# Patient Record
Sex: MALE | Race: WHITE | NOT HISPANIC OR LATINO | Employment: FULL TIME | URBAN - METROPOLITAN AREA
[De-identification: names, ages, dates, MRNs, and addresses within clinical notes are randomized per-mention and may not be internally consistent; named-entity substitution may affect disease eponyms.]

---

## 2021-09-16 ENCOUNTER — TELEPHONE (OUTPATIENT)
Dept: FAMILY MEDICINE CLINIC | Facility: CLINIC | Age: 56
End: 2021-09-16

## 2021-09-16 NOTE — TELEPHONE ENCOUNTER
Called patient to let him know to arrive at 1pm on Monday 9/20 for appointment. Left message asking patient what er he was seen in so we could try to obtain records for visit.

## 2021-09-20 ENCOUNTER — OFFICE VISIT (OUTPATIENT)
Dept: FAMILY MEDICINE CLINIC | Facility: CLINIC | Age: 56
End: 2021-09-20

## 2021-09-20 VITALS
WEIGHT: 184 LBS | TEMPERATURE: 99.1 F | DIASTOLIC BLOOD PRESSURE: 110 MMHG | HEIGHT: 70 IN | HEART RATE: 101 BPM | BODY MASS INDEX: 26.34 KG/M2 | SYSTOLIC BLOOD PRESSURE: 182 MMHG | OXYGEN SATURATION: 98 %

## 2021-09-20 DIAGNOSIS — Z91.018 NUT ALLERGY: ICD-10-CM

## 2021-09-20 DIAGNOSIS — Z80.42 FAMILY HISTORY OF PROSTATE CANCER IN FATHER: ICD-10-CM

## 2021-09-20 DIAGNOSIS — Z13.220 SCREENING FOR CHOLESTEROL LEVEL: ICD-10-CM

## 2021-09-20 DIAGNOSIS — I10 ESSENTIAL HYPERTENSION: Primary | ICD-10-CM

## 2021-09-20 DIAGNOSIS — Z12.5 ENCOUNTER FOR PROSTATE CANCER SCREENING: ICD-10-CM

## 2021-09-20 PROBLEM — F17.210 CIGARETTE SMOKER: Status: ACTIVE | Noted: 2021-09-20

## 2021-09-20 PROCEDURE — 99204 OFFICE O/P NEW MOD 45 MIN: CPT | Performed by: FAMILY MEDICINE

## 2021-09-20 RX ORDER — PREDNISONE 10 MG/1
TABLET ORAL
COMMUNITY
Start: 2021-08-27 | End: 2021-10-26

## 2021-09-20 RX ORDER — EPINEPHRINE 0.3 MG/.3ML
0.3 INJECTION SUBCUTANEOUS ONCE
Qty: 2 EACH | Refills: 1 | Status: SHIPPED | OUTPATIENT
Start: 2021-09-20 | End: 2021-09-20

## 2021-09-20 RX ORDER — OMEPRAZOLE 20 MG/1
20 CAPSULE, DELAYED RELEASE ORAL AS NEEDED
COMMUNITY

## 2021-09-20 RX ORDER — AMLODIPINE BESYLATE 5 MG/1
5 TABLET ORAL DAILY
Qty: 30 TABLET | Refills: 5 | Status: SHIPPED | OUTPATIENT
Start: 2021-09-20 | End: 2022-03-22 | Stop reason: SDUPTHER

## 2021-09-20 RX ORDER — DIPHENHYDRAMINE HCL 50 MG
50 CAPSULE ORAL EVERY 6 HOURS PRN
COMMUNITY
End: 2022-10-26

## 2021-09-20 RX ORDER — OMEGA-3/DHA/EPA/FISH OIL 500-1000MG
1000 CAPSULE ORAL DAILY
COMMUNITY

## 2021-09-20 RX ORDER — LOSARTAN POTASSIUM 50 MG/1
50 TABLET ORAL DAILY
Qty: 30 TABLET | Refills: 5 | Status: SHIPPED | OUTPATIENT
Start: 2021-09-20 | End: 2021-10-26

## 2021-09-20 NOTE — PROGRESS NOTES
Subjective   Juanjose Barton is a 56 y.o. male.   Chief Complaint   Patient presents with   • Establish Care       History of Present Illness   Presents to the office today as a new patient.  No previous records.  Tells us he originally planned just to get established with a doctor since he had not had one in a long time.  He has had no doctor in 30 years.  Apparently went to [facility] recently with swelling in his face which was felt to be due to pecans in pecan cookies.  He was treated with Benadryl and steroids and that problem has gotten better.  He has avoided nuts since that time.  Apparently his blood pressure was high in the ER as well and they told him he really should get in with somebody to get that addressed.  Apparently his blood pressure was high on another occasion several years ago.  He does not report daily symptoms, but does experience from time to time episodes of tingling in the scalp or pounding in his ears or a ringing in his ears.  No other officially diagnosed problems.  He had an inguinal hernia repair bilaterally in 1997.    No other complaints today.      Patient Active Problem List    Diagnosis Date Noted   • Essential hypertension 09/20/2021   • Nut allergy 09/20/2021   • Family history of prostate cancer in father 09/20/2021           Past Surgical History:   Procedure Laterality Date   • INGUINAL HERNIA REPAIR Bilateral 1997     Current Outpatient Medications on File Prior to Visit   Medication Sig   • diphenhydrAMINE (BENADRYL) 50 MG capsule Take 50 mg by mouth Every 6 (Six) Hours As Needed for Itching.   • Omega-3 Fatty Acids (OMEGA 3 500 PO) Take  by mouth.   • omeprazole (priLOSEC) 20 MG capsule Take 20 mg by mouth Daily.   • predniSONE (DELTASONE) 10 MG tablet TAKE 4 TABLETS BY MOUTH ONCE DAILY FOR 5 DAYS     No current facility-administered medications on file prior to visit.     Allergies   Allergen Reactions   • Nuts Swelling     Social History     Socioeconomic History  "  • Marital status: Single     Spouse name: Not on file   • Number of children: Not on file   • Years of education: Not on file   • Highest education level: Not on file   Tobacco Use   • Smoking status: Current Every Day Smoker   • Smokeless tobacco: Never Used     Family History   Problem Relation Age of Onset   • Arthritis Mother    • Dementia Mother    • Heart disease Father    • Alcohol abuse Brother    • Arthritis Paternal Aunt    • No Known Problems Brother        Review of Systems    Objective   BP (!) 182/110 (BP Location: Left arm, Patient Position: Sitting, Cuff Size: Adult)   Pulse 101   Temp 99.1 °F (37.3 °C) (Oral)   Ht 177.8 cm (70\")   Wt 83.5 kg (184 lb)   SpO2 98%   BMI 26.40 kg/m²   Physical Exam  Constitutional:       Appearance: Normal appearance. He is well-developed.      Comments: Wearing a face mask     HENT:      Head: Normocephalic and atraumatic.      Comments: No swelling of the lips or oropharynx today.  Eyes:      Conjunctiva/sclera: Conjunctivae normal.   Cardiovascular:      Rate and Rhythm: Normal rate and regular rhythm.      Heart sounds: No murmur heard.     Pulmonary:      Effort: Pulmonary effort is normal. No respiratory distress.      Breath sounds: Normal breath sounds. No wheezing or rhonchi.   Musculoskeletal:         General: Normal range of motion.      Cervical back: Normal range of motion.      Right lower leg: No edema.      Left lower leg: No edema.   Skin:     General: Skin is warm and dry.      Findings: No rash.   Neurological:      Mental Status: He is alert and oriented to person, place, and time.   Psychiatric:         Behavior: Behavior normal.       Assessment/Plan   Diagnoses and all orders for this visit:    1. Essential hypertension (Primary)  -     CBC & Differential  -     Comprehensive Metabolic Panel  -     amLODIPine (NORVASC) 5 MG tablet; Take 1 tablet by mouth Daily.  Dispense: 30 tablet; Refill: 5  -     losartan (Cozaar) 50 MG tablet; Take 1 " tablet by mouth Daily.  Dispense: 30 tablet; Refill: 5    2. Nut allergy  -     EPINEPHrine (EpiPen 2-Manoj) 0.3 MG/0.3ML solution auto-injector injection; Inject 0.3 mL into the appropriate muscle as directed by prescriber 1 (One) Time for 1 dose.  Dispense: 2 each; Refill: 1  -     Ambulatory Referral to Allergy    3. Encounter for prostate cancer screening  -     PSA Screen    4. Screening for cholesterol level  -     Lipid Panel    5. Family history of prostate cancer in father    He does not have a history of hypertension, but in the office on 2 occasions his blood pressure was high, once at 182/110 and secondly 192/100.  Also reported that his blood pressure was high at the ER in [place].  He believes his blood pressure was over 170 on a separate occasion back in 2016 or 2017.  We will formally diagnose hypertension today and start him on 5 mg of amlodipine and 50 mg of losartan daily.  We will get labs today to screen him for high cholesterol, diabetes, anemia, renal dysfunction.  We will get a PSA since his father had prostate cancer.  We will follow up with him briefly when the results of his test are back.  I would like to see him again in 3 weeks to recheck his blood pressure, go over his labs if needed, and talk further about other age-appropriate preventive health care including colorectal cancer screening and possibly low-dose CT scanning of the chest to screen for lung cancer..  Finally, with this suspected nut allergy, I think he really should have a more in-depth evaluation of his allergic status.  We will make a referral to allergy in [place].  These are all new problems.  They require further assessment with multiple lab studies as above and treatment with prescription medication therapy.         Call with any problems or concerns before next visit  Return in about 3 weeks (around 10/11/2021) for Recheck blood pressure.      Much of this report is an electronic transcription of spoken language to  printed text using Dragon dictation software.  As such, the subtleties and finesse of spoken language may permit erroneous, or at times, nonsensical words or phrases to be inadvertently transcribed; thus changes may be made at a later date to rectify these errors.

## 2021-09-21 LAB
ALBUMIN SERPL-MCNC: 4.9 G/DL (ref 3.8–4.9)
ALBUMIN/GLOB SERPL: 2.1 {RATIO} (ref 1.2–2.2)
ALP SERPL-CCNC: 57 IU/L (ref 44–121)
ALT SERPL-CCNC: 28 IU/L (ref 0–44)
AST SERPL-CCNC: 23 IU/L (ref 0–40)
BASOPHILS # BLD AUTO: 0.1 X10E3/UL (ref 0–0.2)
BASOPHILS NFR BLD AUTO: 1 %
BILIRUB SERPL-MCNC: 0.4 MG/DL (ref 0–1.2)
BUN SERPL-MCNC: 14 MG/DL (ref 6–24)
BUN/CREAT SERPL: 14 (ref 9–20)
CALCIUM SERPL-MCNC: 9.9 MG/DL (ref 8.7–10.2)
CHLORIDE SERPL-SCNC: 102 MMOL/L (ref 96–106)
CHOLEST SERPL-MCNC: 183 MG/DL (ref 100–199)
CO2 SERPL-SCNC: 24 MMOL/L (ref 20–29)
CREAT SERPL-MCNC: 1 MG/DL (ref 0.76–1.27)
EOSINOPHIL # BLD AUTO: 0.2 X10E3/UL (ref 0–0.4)
EOSINOPHIL NFR BLD AUTO: 2 %
ERYTHROCYTE [DISTWIDTH] IN BLOOD BY AUTOMATED COUNT: 12.5 % (ref 11.6–15.4)
GLOBULIN SER CALC-MCNC: 2.3 G/DL (ref 1.5–4.5)
GLUCOSE SERPL-MCNC: 88 MG/DL (ref 65–99)
HCT VFR BLD AUTO: 47.8 % (ref 37.5–51)
HDLC SERPL-MCNC: 51 MG/DL
HGB BLD-MCNC: 15.8 G/DL (ref 13–17.7)
IMM GRANULOCYTES # BLD AUTO: 0 X10E3/UL (ref 0–0.1)
IMM GRANULOCYTES NFR BLD AUTO: 1 %
LDLC SERPL CALC-MCNC: 111 MG/DL (ref 0–99)
LYMPHOCYTES # BLD AUTO: 2.3 X10E3/UL (ref 0.7–3.1)
LYMPHOCYTES NFR BLD AUTO: 27 %
MCH RBC QN AUTO: 30.5 PG (ref 26.6–33)
MCHC RBC AUTO-ENTMCNC: 33.1 G/DL (ref 31.5–35.7)
MCV RBC AUTO: 92 FL (ref 79–97)
MONOCYTES # BLD AUTO: 1 X10E3/UL (ref 0.1–0.9)
MONOCYTES NFR BLD AUTO: 11 %
NEUTROPHILS # BLD AUTO: 5.1 X10E3/UL (ref 1.4–7)
NEUTROPHILS NFR BLD AUTO: 58 %
PLATELET # BLD AUTO: 277 X10E3/UL (ref 150–450)
POTASSIUM SERPL-SCNC: 4.7 MMOL/L (ref 3.5–5.2)
PROT SERPL-MCNC: 7.2 G/DL (ref 6–8.5)
PSA SERPL-MCNC: 3.2 NG/ML (ref 0–4)
RBC # BLD AUTO: 5.18 X10E6/UL (ref 4.14–5.8)
SODIUM SERPL-SCNC: 139 MMOL/L (ref 134–144)
TRIGL SERPL-MCNC: 117 MG/DL (ref 0–149)
VLDLC SERPL CALC-MCNC: 21 MG/DL (ref 5–40)
WBC # BLD AUTO: 8.6 X10E3/UL (ref 3.4–10.8)

## 2021-10-26 ENCOUNTER — OFFICE VISIT (OUTPATIENT)
Dept: FAMILY MEDICINE CLINIC | Facility: CLINIC | Age: 56
End: 2021-10-26

## 2021-10-26 VITALS
DIASTOLIC BLOOD PRESSURE: 90 MMHG | BODY MASS INDEX: 26.48 KG/M2 | OXYGEN SATURATION: 96 % | HEART RATE: 80 BPM | WEIGHT: 185 LBS | SYSTOLIC BLOOD PRESSURE: 160 MMHG | HEIGHT: 70 IN | TEMPERATURE: 98.4 F

## 2021-10-26 DIAGNOSIS — I10 ESSENTIAL HYPERTENSION: ICD-10-CM

## 2021-10-26 PROCEDURE — 99214 OFFICE O/P EST MOD 30 MIN: CPT | Performed by: FAMILY MEDICINE

## 2021-10-26 RX ORDER — LOSARTAN POTASSIUM 100 MG/1
100 TABLET ORAL DAILY
Qty: 30 TABLET | Refills: 3 | Status: SHIPPED | OUTPATIENT
Start: 2021-10-26 | End: 2022-03-22 | Stop reason: SDUPTHER

## 2021-10-26 RX ORDER — EPINEPHRINE 0.3 MG/.3ML
INJECTION SUBCUTANEOUS
COMMUNITY
Start: 2021-09-20

## 2021-10-26 NOTE — PROGRESS NOTES
Subjective   Juanjose Barton is a 56 y.o. male.   Chief Complaint   Patient presents with   • Hypertension       History of Present Illness   Patient presents today for hi HTN. He reports its usually 150s/100 at home.  Blood pressure was very high at previous visit.  He was 182/110.  Started him on 5 of Norvasc and 50 of losartan daily.  We did basic labs and these have been reviewed with him outside of the office visits.    He denies any side effects of the medications.  No swelling in his feet.  No headaches.  No cough.  He generally feels pretty good.  He is able to check his blood pressure at home and has been doing so on a daily basis.  Today in the office he is a little higher than what he reports at home at 160/90.    Patient Active Problem List    Diagnosis Date Noted   • Essential hypertension 09/20/2021   • Nut allergy 09/20/2021   • Family history of prostate cancer in father 09/20/2021   • Cigarette smoker 09/20/2021     Note Last Updated: 9/20/2021     1 pack/day since age 27             Past Surgical History:   Procedure Laterality Date   • INGUINAL HERNIA REPAIR Bilateral 1997     Current Outpatient Medications on File Prior to Visit   Medication Sig   • amLODIPine (NORVASC) 5 MG tablet Take 1 tablet by mouth Daily.   • diphenhydrAMINE (BENADRYL) 50 MG capsule Take 50 mg by mouth Every 6 (Six) Hours As Needed for Itching.   • Omega-3 Fatty Acids (OMEGA 3 500 PO) Take  by mouth.   • omeprazole (priLOSEC) 20 MG capsule Take 20 mg by mouth Daily.   • [DISCONTINUED] losartan (Cozaar) 50 MG tablet Take 1 tablet by mouth Daily.   • EPINEPHrine (EPIPEN) 0.3 MG/0.3ML solution auto-injector injection    • [DISCONTINUED] predniSONE (DELTASONE) 10 MG tablet TAKE 4 TABLETS BY MOUTH ONCE DAILY FOR 5 DAYS     No current facility-administered medications on file prior to visit.     Allergies   Allergen Reactions   • Nuts Swelling     Social History     Socioeconomic History   • Marital status: Single   Tobacco Use   •  "Smoking status: Current Every Day Smoker     Packs/day: 1.00     Years: 29.00     Pack years: 29.00   • Smokeless tobacco: Never Used     Family History   Problem Relation Age of Onset   • Arthritis Mother    • Dementia Mother    • Heart disease Father    • Alcohol abuse Brother    • Arthritis Paternal Aunt    • No Known Problems Brother        Review of Systems    Objective   /90 (BP Location: Left arm, Patient Position: Sitting, Cuff Size: Adult)   Pulse 80   Temp 98.4 °F (36.9 °C) (Oral)   Ht 177.8 cm (70\")   Wt 83.9 kg (185 lb)   SpO2 96%   BMI 26.54 kg/m²   Physical Exam      Office Visit on 09/20/2021   Component Date Value Ref Range Status   • WBC 09/20/2021 8.6  3.4 - 10.8 x10E3/uL Final   • RBC 09/20/2021 5.18  4.14 - 5.80 x10E6/uL Final   • Hemoglobin 09/20/2021 15.8  13.0 - 17.7 g/dL Final   • Hematocrit 09/20/2021 47.8  37.5 - 51.0 % Final   • MCV 09/20/2021 92  79 - 97 fL Final   • MCH 09/20/2021 30.5  26.6 - 33.0 pg Final   • MCHC 09/20/2021 33.1  31.5 - 35.7 g/dL Final   • RDW 09/20/2021 12.5  11.6 - 15.4 % Final   • Platelets 09/20/2021 277  150 - 450 x10E3/uL Final   • Neutrophil Rel % 09/20/2021 58  Not Estab. % Final   • Lymphocyte Rel % 09/20/2021 27  Not Estab. % Final   • Monocyte Rel % 09/20/2021 11  Not Estab. % Final   • Eosinophil Rel % 09/20/2021 2  Not Estab. % Final   • Basophil Rel % 09/20/2021 1  Not Estab. % Final   • Neutrophils Absolute 09/20/2021 5.1  1.4 - 7.0 x10E3/uL Final   • Lymphocytes Absolute 09/20/2021 2.3  0.7 - 3.1 x10E3/uL Final   • Monocytes Absolute 09/20/2021 1.0* 0.1 - 0.9 x10E3/uL Final   • Eosinophils Absolute 09/20/2021 0.2  0.0 - 0.4 x10E3/uL Final   • Basophils Absolute 09/20/2021 0.1  0.0 - 0.2 x10E3/uL Final   • Immature Granulocyte Rel % 09/20/2021 1  Not Estab. % Final   • Immature Grans Absolute 09/20/2021 0.0  0.0 - 0.1 x10E3/uL Final   • Glucose 09/20/2021 88  65 - 99 mg/dL Final   • BUN 09/20/2021 14  6 - 24 mg/dL Final   • Creatinine " 09/20/2021 1.00  0.76 - 1.27 mg/dL Final   • eGFR Non  Am 09/20/2021 85  >59 mL/min/1.73 Final   • eGFR African Am 09/20/2021 98  >59 mL/min/1.73 Final    Comment: **[facility] currently reports eGFR in compliance with the current**    recommendations of the National Kidney Foundation. [facility] will    update reporting as new guidelines are published from the NKF-ASN    Task force.     • BUN/Creatinine Ratio 09/20/2021 14  9 - 20 Final   • Sodium 09/20/2021 139  134 - 144 mmol/L Final   • Potassium 09/20/2021 4.7  3.5 - 5.2 mmol/L Final   • Chloride 09/20/2021 102  96 - 106 mmol/L Final   • Total CO2 09/20/2021 24  20 - 29 mmol/L Final   • Calcium 09/20/2021 9.9  8.7 - 10.2 mg/dL Final   • Total Protein 09/20/2021 7.2  6.0 - 8.5 g/dL Final   • Albumin 09/20/2021 4.9  3.8 - 4.9 g/dL Final   • Globulin 09/20/2021 2.3  1.5 - 4.5 g/dL Final   • A/G Ratio 09/20/2021 2.1  1.2 - 2.2 Final   • Total Bilirubin 09/20/2021 0.4  0.0 - 1.2 mg/dL Final   • Alkaline Phosphatase 09/20/2021 57  44 - 121 IU/L Final                  **Please note reference interval change**   • AST (SGOT) 09/20/2021 23  0 - 40 IU/L Final   • ALT (SGPT) 09/20/2021 28  0 - 44 IU/L Final   • Total Cholesterol 09/20/2021 183  100 - 199 mg/dL Final   • Triglycerides 09/20/2021 117  0 - 149 mg/dL Final   • HDL Cholesterol 09/20/2021 51  >39 mg/dL Final   • VLDL Cholesterol Luis 09/20/2021 21  5 - 40 mg/dL Final   • LDL Chol Calc (NIH) 09/20/2021 111* 0 - 99 mg/dL Final   • PSA 09/20/2021 3.2  0.0 - 4.0 ng/mL Final    Comment: Roche ECLIA methodology.  According to the American Urological Association, Serum PSA should  decrease and remain at undetectable levels after radical  prostatectomy. The AUA defines biochemical recurrence as an initial  PSA value 0.2 ng/mL or greater followed by a subsequent confirmatory  PSA value 0.2 ng/mL or greater.  Values obtained with different assay methods or kits cannot be used  interchangeably. Results cannot be  interpreted as absolute evidence  of the presence or absence of malignant disease.           Assessment/Plan   Diagnoses and all orders for this visit:    1. Essential hypertension  -     losartan (Cozaar) 100 MG tablet; Take 1 tablet by mouth Daily.  Dispense: 30 tablet; Refill: 3    Is hypertension is a chronic problem currently in a state of exacerbation requiring adjustment to prescription medication.  We will increase his losartan to 100 mg/day.  New prescription sent to the pharmacy.    Continue to check blood pressure at home and in about 2 weeks call or send a message through [employer] to let me know how his blood pressure has been doing.  If it is in good range, we will continue these medications and anticipate following up again in the spring.  If he requires adjustments, we will make those adjustments and then follow-up with him again here in the office.       Call with any problems or concerns before next visit  Return in about 6 months (around 4/26/2022).      Much of this report is an electronic transcription of spoken language to printed text using Dragon dictation software.  As such, the subtleties and finesse of spoken language may permit erroneous, or at times, nonsensical words or phrases to be inadvertently transcribed; thus changes may be made at a later date to rectify these errors.

## 2022-03-22 DIAGNOSIS — I10 ESSENTIAL HYPERTENSION: ICD-10-CM

## 2022-03-22 RX ORDER — AMLODIPINE BESYLATE 5 MG/1
5 TABLET ORAL DAILY
Qty: 30 TABLET | Refills: 5 | Status: SHIPPED | OUTPATIENT
Start: 2022-03-22 | End: 2022-06-27

## 2022-03-22 RX ORDER — LOSARTAN POTASSIUM 100 MG/1
100 TABLET ORAL DAILY
Qty: 30 TABLET | Refills: 3 | Status: SHIPPED | OUTPATIENT
Start: 2022-03-22 | End: 2022-06-27

## 2022-03-22 NOTE — TELEPHONE ENCOUNTER
PATIENT REQUESTED A REFILL ON:amLODIPine (NORVASC) 5 MG tablet  losartan (Cozaar) 100 MG tablet    PATIENT CAN BE REACHED ON:   [phone]  PHARMACY [address] [place] [address] - [phone]  - [phone]   [phone]

## 2022-04-26 ENCOUNTER — OFFICE VISIT (OUTPATIENT)
Dept: FAMILY MEDICINE CLINIC | Facility: CLINIC | Age: 57
End: 2022-04-26

## 2022-04-26 VITALS
HEIGHT: 70 IN | WEIGHT: 174.8 LBS | BODY MASS INDEX: 25.03 KG/M2 | DIASTOLIC BLOOD PRESSURE: 81 MMHG | HEART RATE: 74 BPM | SYSTOLIC BLOOD PRESSURE: 126 MMHG | TEMPERATURE: 98.7 F | OXYGEN SATURATION: 97 % | RESPIRATION RATE: 18 BRPM

## 2022-04-26 DIAGNOSIS — Z12.11 COLON CANCER SCREENING: ICD-10-CM

## 2022-04-26 DIAGNOSIS — I10 ESSENTIAL HYPERTENSION: Primary | ICD-10-CM

## 2022-04-26 DIAGNOSIS — M70.22 OLECRANON BURSITIS OF LEFT ELBOW: ICD-10-CM

## 2022-04-26 PROCEDURE — 99214 OFFICE O/P EST MOD 30 MIN: CPT | Performed by: FAMILY MEDICINE

## 2022-04-26 RX ORDER — MULTIPLE VITAMINS W/ MINERALS TAB 9MG-400MCG
1 TAB ORAL DAILY
COMMUNITY

## 2022-04-26 NOTE — PROGRESS NOTES
Subjective   Juanjose Barton is a 57 y.o. male.   Chief Complaint   Patient presents with   • Hypertension   • Joint Swelling       History of Present Illness   Presents to the office today for follow-up on hypertension.  Blood pressure in the office today is excellent at 126/81.  He is tolerating amlodipine and losartan without side effects.  He has also started walking more and exercising.  He has lost 10 pounds in the last 6 months.  New complaint today is that of swelling in his left elbow.  He banged his left elbow on something 4 months ago, but this really only started 2 to 3 weeks ago.  It is just on the tip of the elbow.  Its not really painful.  Is not hot to the touch.  Just gets in the way when he puts his arm through the sleeve.    Preventive care- has never had anything to screen him for colon cancer.  We did a PSA along with other labs 6 months ago.  All of his labs at that time were excellent.      Patient Active Problem List    Diagnosis Date Noted   • Essential hypertension 09/20/2021   • Nut allergy 09/20/2021   • Family history of prostate cancer in father 09/20/2021   • Cigarette smoker 09/20/2021     Note Last Updated: 9/20/2021     1 pack/day since age 27             Past Surgical History:   Procedure Laterality Date   • INGUINAL HERNIA REPAIR Bilateral 1997     Current Outpatient Medications on File Prior to Visit   Medication Sig   • amLODIPine (NORVASC) 5 MG tablet Take 1 tablet by mouth Daily.   • diphenhydrAMINE (BENADRYL) 50 MG capsule Take 50 mg by mouth Every 6 (Six) Hours As Needed for Itching.   • losartan (Cozaar) 100 MG tablet Take 1 tablet by mouth Daily.   • multivitamin with minerals (CENTRUM SILVER 50+MEN PO) Take 1 tablet by mouth Daily.   • Omega-3 Fatty Acids (Omega 3 500) 500 MG capsule Take 1,000 mg by mouth Daily.   • omeprazole (priLOSEC) 20 MG capsule Take 20 mg by mouth As Needed.   • EPINEPHrine (EPIPEN) 0.3 MG/0.3ML solution auto-injector injection      No current  "facility-administered medications on file prior to visit.     Allergies   Allergen Reactions   • Nuts Swelling     Social History     Socioeconomic History   • Marital status:    • Number of children: 0   Tobacco Use   • Smoking status: Current Every Day Smoker     Packs/day: 1.00     Years: 30.00     Pack years: 30.00     Types: Cigarettes     Start date: 1992   • Smokeless tobacco: Never Used   Vaping Use   • Vaping Use: Never used   Substance and Sexual Activity   • Alcohol use: Not Currently   • Drug use: Never   • Sexual activity: Not Currently     Family History   Problem Relation Age of Onset   • Arthritis Mother    • Dementia Mother    • Heart disease Father    • Alcohol abuse Brother    • Arthritis Paternal Aunt    • No Known Problems Brother        Review of Systems    Objective   /81 (BP Location: Left arm, Patient Position: Sitting, Cuff Size: Adult)   Pulse 74   Temp 98.7 °F (37.1 °C) (Infrared)   Resp 18   Ht 177.8 cm (70\")   Wt 79.3 kg (174 lb 12.8 oz)   SpO2 97%   BMI 25.08 kg/m²   Physical Exam  Constitutional:       General: He is not in acute distress.     Appearance: Normal appearance. He is well-developed. He is not toxic-appearing.      Comments: Wearing a face mask     HENT:      Head: Normocephalic and atraumatic.   Eyes:      Conjunctiva/sclera: Conjunctivae normal.   Cardiovascular:      Rate and Rhythm: Normal rate.   Pulmonary:      Effort: Pulmonary effort is normal. No respiratory distress.   Musculoskeletal:         General: Normal range of motion.      Cervical back: Normal range of motion.      Right lower leg: No edema.      Left lower leg: No edema.      Comments: He does have a golf ball sized fleshy colored swelling right over the olecranon of the left elbow.  Otherwise has normal painless range of motion of the left elbow.  The area is not hot to the touch.   Skin:     General: Skin is warm and dry.      Findings: No rash.   Neurological:      Mental Status: " He is alert and oriented to person, place, and time.   Psychiatric:         Mood and Affect: Mood normal.         Behavior: Behavior normal.           No visits with results within 4 Month(s) from this visit.   Latest known visit with results is:   Office Visit on 09/20/2021   Component Date Value Ref Range Status   • WBC 09/20/2021 8.6  3.4 - 10.8 x10E3/uL Final   • RBC 09/20/2021 5.18  4.14 - 5.80 x10E6/uL Final   • Hemoglobin 09/20/2021 15.8  13.0 - 17.7 g/dL Final   • Hematocrit 09/20/2021 47.8  37.5 - 51.0 % Final   • MCV 09/20/2021 92  79 - 97 fL Final   • MCH 09/20/2021 30.5  26.6 - 33.0 pg Final   • MCHC 09/20/2021 33.1  31.5 - 35.7 g/dL Final   • RDW 09/20/2021 12.5  11.6 - 15.4 % Final   • Platelets 09/20/2021 277  150 - 450 x10E3/uL Final   • Neutrophil Rel % 09/20/2021 58  Not Estab. % Final   • Lymphocyte Rel % 09/20/2021 27  Not Estab. % Final   • Monocyte Rel % 09/20/2021 11  Not Estab. % Final   • Eosinophil Rel % 09/20/2021 2  Not Estab. % Final   • Basophil Rel % 09/20/2021 1  Not Estab. % Final   • Neutrophils Absolute 09/20/2021 5.1  1.4 - 7.0 x10E3/uL Final   • Lymphocytes Absolute 09/20/2021 2.3  0.7 - 3.1 x10E3/uL Final   • Monocytes Absolute 09/20/2021 1.0 (A) 0.1 - 0.9 x10E3/uL Final   • Eosinophils Absolute 09/20/2021 0.2  0.0 - 0.4 x10E3/uL Final   • Basophils Absolute 09/20/2021 0.1  0.0 - 0.2 x10E3/uL Final   • Immature Granulocyte Rel % 09/20/2021 1  Not Estab. % Final   • Immature Grans Absolute 09/20/2021 0.0  0.0 - 0.1 x10E3/uL Final   • Glucose 09/20/2021 88  65 - 99 mg/dL Final   • BUN 09/20/2021 14  6 - 24 mg/dL Final   • Creatinine 09/20/2021 1.00  0.76 - 1.27 mg/dL Final   • eGFR Non  Am 09/20/2021 85  >59 mL/min/1.73 Final   • eGFR African Am 09/20/2021 98  >59 mL/min/1.73 Final    Comment: **[facility] currently reports eGFR in compliance with the current**    recommendations of the National Kidney Foundation. [facility] will    update reporting as new guidelines are  published from the NKF-ASN    Task force.     • BUN/Creatinine Ratio 09/20/2021 14  9 - 20 Final   • Sodium 09/20/2021 139  134 - 144 mmol/L Final   • Potassium 09/20/2021 4.7  3.5 - 5.2 mmol/L Final   • Chloride 09/20/2021 102  96 - 106 mmol/L Final   • Total CO2 09/20/2021 24  20 - 29 mmol/L Final   • Calcium 09/20/2021 9.9  8.7 - 10.2 mg/dL Final   • Total Protein 09/20/2021 7.2  6.0 - 8.5 g/dL Final   • Albumin 09/20/2021 4.9  3.8 - 4.9 g/dL Final   • Globulin 09/20/2021 2.3  1.5 - 4.5 g/dL Final   • A/G Ratio 09/20/2021 2.1  1.2 - 2.2 Final   • Total Bilirubin 09/20/2021 0.4  0.0 - 1.2 mg/dL Final   • Alkaline Phosphatase 09/20/2021 57  44 - 121 IU/L Final                  **Please note reference interval change**   • AST (SGOT) 09/20/2021 23  0 - 40 IU/L Final   • ALT (SGPT) 09/20/2021 28  0 - 44 IU/L Final   • Total Cholesterol 09/20/2021 183  100 - 199 mg/dL Final   • Triglycerides 09/20/2021 117  0 - 149 mg/dL Final   • HDL Cholesterol 09/20/2021 51  >39 mg/dL Final   • VLDL Cholesterol Luis 09/20/2021 21  5 - 40 mg/dL Final   • LDL Chol Calc (NIH) 09/20/2021 111 (A) 0 - 99 mg/dL Final   • PSA 09/20/2021 3.2  0.0 - 4.0 ng/mL Final    Comment: Roche ECLIA methodology.  According to the American Urological Association, Serum PSA should  decrease and remain at undetectable levels after radical  prostatectomy. The AUA defines biochemical recurrence as an initial  PSA value 0.2 ng/mL or greater followed by a subsequent confirmatory  PSA value 0.2 ng/mL or greater.  Values obtained with different assay methods or kits cannot be used  interchangeably. Results cannot be interpreted as absolute evidence  of the presence or absence of malignant disease.           Assessment/Plan   Diagnoses and all orders for this visit:    1. Essential hypertension (Primary)    2. Olecranon bursitis of left elbow    3. Colon cancer screening  -     Cologuard - Stool, Per Rectum; Future    Hypertensions a chronic problem currently at  goal.  Continue amlodipine 5 mg/day and losartan 100 mg/day.  He is not having any side effects of these medications.  Clinically, he has a left olecranon bursitis.  I recommended he just use a left elbow sleeve to provide gentle compression and protection to the area.  If it becomes red, hot to the touch, painful, then he will need an I&D and probably orthopedic evaluation.  We will do a Cologuard at his convenience.  Will follow him up in 6 months to recheck his blood pressure and do annual lab work.  At that point, if his blood pressure remains good I think it would be reasonable to go to once a year follow-up visits.        Call with any problems or concerns before next visit       Return in about 6 months (around 10/26/2022).      Much of this report is an electronic transcription of spoken language to printed text using Dragon dictation software.  As such, the subtleties and finesse of spoken language may permit erroneous, or at times, nonsensical words or phrases to be inadvertently transcribed; thus changes may be made at a later date to rectify these errors.     Renetta Rich MD4/26/202216:07 EDT  This note has been electronically signed

## 2022-06-26 DIAGNOSIS — I10 ESSENTIAL HYPERTENSION: ICD-10-CM

## 2022-06-27 DIAGNOSIS — I10 ESSENTIAL HYPERTENSION: ICD-10-CM

## 2022-06-27 RX ORDER — AMLODIPINE BESYLATE 5 MG/1
TABLET ORAL
Qty: 30 TABLET | Refills: 0 | Status: SHIPPED | OUTPATIENT
Start: 2022-06-27 | End: 2022-08-05 | Stop reason: SDUPTHER

## 2022-06-27 RX ORDER — LOSARTAN POTASSIUM 100 MG/1
TABLET ORAL
Qty: 90 TABLET | Refills: 0 | Status: SHIPPED | OUTPATIENT
Start: 2022-06-27 | End: 2022-09-16

## 2022-08-05 DIAGNOSIS — I10 ESSENTIAL HYPERTENSION: ICD-10-CM

## 2022-08-05 RX ORDER — AMLODIPINE BESYLATE 5 MG/1
5 TABLET ORAL DAILY
Qty: 90 TABLET | Refills: 0 | Status: SHIPPED | OUTPATIENT
Start: 2022-08-05 | End: 2022-09-16

## 2022-08-05 NOTE — TELEPHONE ENCOUNTER
Spoke with pharmacist and she was taking it out of the computer. It will only have the 100 mgs now

## 2022-08-05 NOTE — TELEPHONE ENCOUNTER
Caller: Juanjose Barton    Relationship: Self    Best call back number: [phone]       Requested Prescriptions:   Requested Prescriptions     Pending Prescriptions Disp Refills   • amLODIPine (NORVASC) 5 MG tablet 30 tablet 0     Sig: Take 1 tablet by mouth Daily.        Pharmacy where request should be sent: [facility] [address] [place] [address] [phone] [place] [phone] FX     Additional details provided by patient: PATIENT HAS 1 TABLET LEFT OF MEDICATION    PATIENT ALSO STATES THE PHARMACY HAS THE LOSARTAN MEDICATION LISTED AS 50 MG  MG. PATIENT IS WANTING TO SEE IF DR CROSS CAN CORRECT IT TO ONLY 100MG    Does the patient have less than a 3 day supply:  [x] Yes  [] No    Matt Waters Rep   08/05/22 11:37 EDT

## 2022-09-15 DIAGNOSIS — I10 ESSENTIAL HYPERTENSION: ICD-10-CM

## 2022-09-16 RX ORDER — LOSARTAN POTASSIUM 100 MG/1
TABLET ORAL
Qty: 90 TABLET | Refills: 0 | Status: SHIPPED | OUTPATIENT
Start: 2022-09-16 | End: 2022-10-26 | Stop reason: SDUPTHER

## 2022-09-16 RX ORDER — AMLODIPINE BESYLATE 5 MG/1
TABLET ORAL
Qty: 90 TABLET | Refills: 0 | Status: SHIPPED | OUTPATIENT
Start: 2022-09-16 | End: 2022-10-26 | Stop reason: SDUPTHER

## 2022-10-26 ENCOUNTER — OFFICE VISIT (OUTPATIENT)
Dept: FAMILY MEDICINE CLINIC | Facility: CLINIC | Age: 57
End: 2022-10-26

## 2022-10-26 VITALS
DIASTOLIC BLOOD PRESSURE: 90 MMHG | RESPIRATION RATE: 18 BRPM | BODY MASS INDEX: 25.51 KG/M2 | OXYGEN SATURATION: 97 % | HEIGHT: 70 IN | HEART RATE: 77 BPM | WEIGHT: 178.2 LBS | TEMPERATURE: 98 F | SYSTOLIC BLOOD PRESSURE: 128 MMHG

## 2022-10-26 DIAGNOSIS — Z13.220 SCREENING FOR CHOLESTEROL LEVEL: ICD-10-CM

## 2022-10-26 DIAGNOSIS — Z12.5 ENCOUNTER FOR PROSTATE CANCER SCREENING: ICD-10-CM

## 2022-10-26 DIAGNOSIS — I10 ESSENTIAL HYPERTENSION: Primary | ICD-10-CM

## 2022-10-26 DIAGNOSIS — K21.9 GASTROESOPHAGEAL REFLUX DISEASE, UNSPECIFIED WHETHER ESOPHAGITIS PRESENT: ICD-10-CM

## 2022-10-26 PROCEDURE — 99214 OFFICE O/P EST MOD 30 MIN: CPT | Performed by: FAMILY MEDICINE

## 2022-10-26 RX ORDER — AMLODIPINE BESYLATE 5 MG/1
5 TABLET ORAL DAILY
Qty: 90 TABLET | Refills: 3 | Status: SHIPPED | OUTPATIENT
Start: 2022-10-26

## 2022-10-26 RX ORDER — LOSARTAN POTASSIUM 100 MG/1
100 TABLET ORAL DAILY
Qty: 90 TABLET | Refills: 3 | Status: SHIPPED | OUTPATIENT
Start: 2022-10-26

## 2022-10-27 LAB
ALBUMIN SERPL-MCNC: 4.9 G/DL (ref 3.8–4.9)
ALBUMIN/GLOB SERPL: 2.2 {RATIO} (ref 1.2–2.2)
ALP SERPL-CCNC: 67 IU/L (ref 44–121)
ALT SERPL-CCNC: 27 IU/L (ref 0–44)
AST SERPL-CCNC: 27 IU/L (ref 0–40)
BASOPHILS # BLD AUTO: 0 X10E3/UL (ref 0–0.2)
BASOPHILS NFR BLD AUTO: 0 %
BILIRUB SERPL-MCNC: 0.4 MG/DL (ref 0–1.2)
BUN SERPL-MCNC: 16 MG/DL (ref 6–24)
BUN/CREAT SERPL: 19 (ref 9–20)
CALCIUM SERPL-MCNC: 9.2 MG/DL (ref 8.7–10.2)
CHLORIDE SERPL-SCNC: 103 MMOL/L (ref 96–106)
CHOLEST SERPL-MCNC: 198 MG/DL (ref 100–199)
CO2 SERPL-SCNC: 19 MMOL/L (ref 20–29)
CREAT SERPL-MCNC: 0.86 MG/DL (ref 0.76–1.27)
EGFRCR SERPLBLD CKD-EPI 2021: 102 ML/MIN/1.73
EOSINOPHIL # BLD AUTO: 0.4 X10E3/UL (ref 0–0.4)
EOSINOPHIL NFR BLD AUTO: 4 %
ERYTHROCYTE [DISTWIDTH] IN BLOOD BY AUTOMATED COUNT: 12.3 % (ref 11.6–15.4)
GLOBULIN SER CALC-MCNC: 2.2 G/DL (ref 1.5–4.5)
GLUCOSE SERPL-MCNC: 89 MG/DL (ref 70–99)
HCT VFR BLD AUTO: 45.7 % (ref 37.5–51)
HDLC SERPL-MCNC: 51 MG/DL
HGB BLD-MCNC: 15.5 G/DL (ref 13–17.7)
IMM GRANULOCYTES # BLD AUTO: 0 X10E3/UL (ref 0–0.1)
IMM GRANULOCYTES NFR BLD AUTO: 0 %
LDLC SERPL CALC-MCNC: 133 MG/DL (ref 0–99)
LYMPHOCYTES # BLD AUTO: 2.6 X10E3/UL (ref 0.7–3.1)
LYMPHOCYTES NFR BLD AUTO: 27 %
MCH RBC QN AUTO: 30.5 PG (ref 26.6–33)
MCHC RBC AUTO-ENTMCNC: 33.9 G/DL (ref 31.5–35.7)
MCV RBC AUTO: 90 FL (ref 79–97)
MONOCYTES # BLD AUTO: 1.1 X10E3/UL (ref 0.1–0.9)
MONOCYTES NFR BLD AUTO: 11 %
NEUTROPHILS # BLD AUTO: 5.5 X10E3/UL (ref 1.4–7)
NEUTROPHILS NFR BLD AUTO: 58 %
PLATELET # BLD AUTO: 316 X10E3/UL (ref 150–450)
POTASSIUM SERPL-SCNC: 4.8 MMOL/L (ref 3.5–5.2)
PROT SERPL-MCNC: 7.1 G/DL (ref 6–8.5)
PSA SERPL-MCNC: 2.1 NG/ML (ref 0–4)
RBC # BLD AUTO: 5.08 X10E6/UL (ref 4.14–5.8)
SODIUM SERPL-SCNC: 138 MMOL/L (ref 134–144)
TRIGL SERPL-MCNC: 79 MG/DL (ref 0–149)
VLDLC SERPL CALC-MCNC: 14 MG/DL (ref 5–40)
WBC # BLD AUTO: 9.7 X10E3/UL (ref 3.4–10.8)

## 2023-10-27 ENCOUNTER — OFFICE VISIT (OUTPATIENT)
Dept: FAMILY MEDICINE CLINIC | Facility: CLINIC | Age: 58
End: 2023-10-27
Payer: COMMERCIAL

## 2023-10-27 VITALS
SYSTOLIC BLOOD PRESSURE: 121 MMHG | BODY MASS INDEX: 25.48 KG/M2 | OXYGEN SATURATION: 93 % | DIASTOLIC BLOOD PRESSURE: 82 MMHG | HEART RATE: 80 BPM | TEMPERATURE: 97.8 F | WEIGHT: 178 LBS | RESPIRATION RATE: 18 BRPM | HEIGHT: 70 IN

## 2023-10-27 DIAGNOSIS — I10 ESSENTIAL HYPERTENSION: Primary | ICD-10-CM

## 2023-10-27 DIAGNOSIS — K21.9 GASTROESOPHAGEAL REFLUX DISEASE, UNSPECIFIED WHETHER ESOPHAGITIS PRESENT: ICD-10-CM

## 2023-10-27 DIAGNOSIS — M75.101 ROTATOR CUFF SYNDROME OF RIGHT SHOULDER: ICD-10-CM

## 2023-10-27 DIAGNOSIS — Z12.5 ENCOUNTER FOR PROSTATE CANCER SCREENING: ICD-10-CM

## 2023-10-27 DIAGNOSIS — Z13.220 SCREENING FOR CHOLESTEROL LEVEL: ICD-10-CM

## 2023-10-27 DIAGNOSIS — F17.210 CIGARETTE SMOKER: ICD-10-CM

## 2023-10-27 PROCEDURE — 99214 OFFICE O/P EST MOD 30 MIN: CPT | Performed by: FAMILY MEDICINE

## 2023-10-27 RX ORDER — LOSARTAN POTASSIUM 100 MG/1
100 TABLET ORAL DAILY
Qty: 90 TABLET | Refills: 3 | Status: SHIPPED | OUTPATIENT
Start: 2023-10-27

## 2023-10-27 RX ORDER — AMLODIPINE BESYLATE 5 MG/1
5 TABLET ORAL DAILY
Qty: 90 TABLET | Refills: 3 | Status: SHIPPED | OUTPATIENT
Start: 2023-10-27

## 2023-10-27 NOTE — PROGRESS NOTES
Subjective   Juanjose Barton is a 58 y.o. male.   Chief Complaint   Patient presents with    Hypertension    Heartburn       History of Present Illness   58-year-old white male with history of GERD, hypertension, family history of prostate cancer in his father, nut allergy and daily smoking presents to the office today for annual follow-up.  I last saw him a year ago.  He had blood work at that time.  He has had no major changes to his medical status since our last visit, but he has had 5 to 6 months of right shoulder discomfort after having to do some overhead work.  Pain gets worse with external rotation of the shoulder or abduction of the elbow.        Patient Active Problem List    Diagnosis Date Noted    Essential hypertension 09/20/2021    Nut allergy 09/20/2021    Family history of prostate cancer in father 09/20/2021    Cigarette smoker 09/20/2021     Note Last Updated: 9/20/2021     1 pack/day since age 27             Past Surgical History:   Procedure Laterality Date    HERNIA REPAIR  09/1997    INGUINAL HERNIA REPAIR Bilateral 1997     Current Outpatient Medications on File Prior to Visit   Medication Sig    multivitamin with minerals tablet tablet Take 1 tablet by mouth Daily.    Omega-3 Fatty Acids (Omega 3 500) 500 MG capsule Take 2 capsules by mouth Daily.    omeprazole (priLOSEC) 20 MG capsule Take 1 capsule by mouth As Needed.    [DISCONTINUED] amLODIPine (NORVASC) 5 MG tablet Take 1 tablet by mouth Daily.    [DISCONTINUED] losartan (COZAAR) 100 MG tablet Take 1 tablet by mouth Daily.    EPINEPHrine (EPIPEN) 0.3 MG/0.3ML solution auto-injector injection      No current facility-administered medications on file prior to visit.     Allergies   Allergen Reactions    Nuts Swelling     Social History     Socioeconomic History    Marital status:     Number of children: 0   Tobacco Use    Smoking status: Every Day     Packs/day: 1.00     Years: 30.00     Additional pack years: 0.00     Total pack years:  "30.00     Types: Cigarettes     Start date: 1992     Passive exposure: Current    Smokeless tobacco: Never   Vaping Use    Vaping Use: Never used   Substance and Sexual Activity    Alcohol use: Not Currently    Drug use: Never    Sexual activity: Not Currently     Family History   Problem Relation Age of Onset    Arthritis Mother     Dementia Mother     Heart disease Father     Alcohol abuse Brother             Arthritis Paternal Aunt     No Known Problems Brother        Review of Systems    Objective   /82 (BP Location: Right arm, Patient Position: Sitting, Cuff Size: Adult)   Pulse 80   Temp 97.8 °F (36.6 °C) (Infrared)   Resp 18   Ht 177.8 cm (70\")   Wt 80.7 kg (178 lb)   SpO2 93%   BMI 25.54 kg/m²   Physical Exam  Constitutional:       General: He is not in acute distress.     Appearance: He is well-developed.      Comments:      HENT:      Head: Normocephalic and atraumatic.   Eyes:      Conjunctiva/sclera: Conjunctivae normal.   Cardiovascular:      Rate and Rhythm: Normal rate and regular rhythm.      Heart sounds: No murmur heard.  Pulmonary:      Effort: Pulmonary effort is normal. No respiratory distress.      Breath sounds: Normal breath sounds.   Musculoskeletal:         General: Normal range of motion.      Cervical back: Normal range of motion.      Right lower leg: No edema.      Left lower leg: No edema.      Comments: Characteristic impingement signs of the right shoulder with range of motion   Skin:     General: Skin is warm and dry.      Findings: No rash.   Neurological:      Mental Status: He is alert and oriented to person, place, and time.      Gait: Gait normal.   Psychiatric:         Mood and Affect: Mood normal.         Behavior: Behavior normal.           No visits with results within 4 Month(s) from this visit.   Latest known visit with results is:   Office Visit on 10/26/2022   Component Date Value Ref Range Status    PSA 10/26/2022 2.1  0.0 - 4.0 ng/mL Final    " Comment: Roche ECLIA methodology.  According to the American Urological Association, Serum PSA should  decrease and remain at undetectable levels after radical  prostatectomy. The AUA defines biochemical recurrence as an initial  PSA value 0.2 ng/mL or greater followed by a subsequent confirmatory  PSA value 0.2 ng/mL or greater.  Values obtained with different assay methods or kits cannot be used  interchangeably. Results cannot be interpreted as absolute evidence  of the presence or absence of malignant disease.      Total Cholesterol 10/26/2022 198  100 - 199 mg/dL Final    Triglycerides 10/26/2022 79  0 - 149 mg/dL Final    HDL Cholesterol 10/26/2022 51  >39 mg/dL Final    VLDL Cholesterol Luis 10/26/2022 14  5 - 40 mg/dL Final    LDL Chol Calc (NIH) 10/26/2022 133 (H)  0 - 99 mg/dL Final    WBC 10/26/2022 9.7  3.4 - 10.8 x10E3/uL Final    RBC 10/26/2022 5.08  4.14 - 5.80 x10E6/uL Final    Hemoglobin 10/26/2022 15.5  13.0 - 17.7 g/dL Final    Hematocrit 10/26/2022 45.7  37.5 - 51.0 % Final    MCV 10/26/2022 90  79 - 97 fL Final    MCH 10/26/2022 30.5  26.6 - 33.0 pg Final    MCHC 10/26/2022 33.9  31.5 - 35.7 g/dL Final    RDW 10/26/2022 12.3  11.6 - 15.4 % Final    Platelets 10/26/2022 316  150 - 450 x10E3/uL Final    Neutrophil Rel % 10/26/2022 58  Not Estab. % Final    Lymphocyte Rel % 10/26/2022 27  Not Estab. % Final    Monocyte Rel % 10/26/2022 11  Not Estab. % Final    Eosinophil Rel % 10/26/2022 4  Not Estab. % Final    Basophil Rel % 10/26/2022 0  Not Estab. % Final    Neutrophils Absolute 10/26/2022 5.5  1.4 - 7.0 x10E3/uL Final    Lymphocytes Absolute 10/26/2022 2.6  0.7 - 3.1 x10E3/uL Final    Monocytes Absolute 10/26/2022 1.1 (H)  0.1 - 0.9 x10E3/uL Final    Eosinophils Absolute 10/26/2022 0.4  0.0 - 0.4 x10E3/uL Final    Basophils Absolute 10/26/2022 0.0  0.0 - 0.2 x10E3/uL Final    Immature Granulocyte Rel % 10/26/2022 0  Not Estab. % Final    Immature Grans Absolute 10/26/2022 0.0  0.0 - 0.1  x10E3/uL Final    Glucose 10/26/2022 89  70 - 99 mg/dL Final    BUN 10/26/2022 16  6 - 24 mg/dL Final    Creatinine 10/26/2022 0.86  0.76 - 1.27 mg/dL Final    EGFR Result 10/26/2022 102  >59 mL/min/1.73 Final    BUN/Creatinine Ratio 10/26/2022 19  9 - 20 Final    Sodium 10/26/2022 138  134 - 144 mmol/L Final    Potassium 10/26/2022 4.8  3.5 - 5.2 mmol/L Final    Chloride 10/26/2022 103  96 - 106 mmol/L Final    Total CO2 10/26/2022 19 (L)  20 - 29 mmol/L Final    Calcium 10/26/2022 9.2  8.7 - 10.2 mg/dL Final    Total Protein 10/26/2022 7.1  6.0 - 8.5 g/dL Final    Albumin 10/26/2022 4.9  3.8 - 4.9 g/dL Final    Globulin 10/26/2022 2.2  1.5 - 4.5 g/dL Final    A/G Ratio 10/26/2022 2.2  1.2 - 2.2 Final    Total Bilirubin 10/26/2022 0.4  0.0 - 1.2 mg/dL Final    Alkaline Phosphatase 10/26/2022 67  44 - 121 IU/L Final    AST (SGOT) 10/26/2022 27  0 - 40 IU/L Final    ALT (SGPT) 10/26/2022 27  0 - 44 IU/L Final            Assessment & Plan   Diagnoses and all orders for this visit:    1. Essential hypertension (Primary)  -     amLODIPine (NORVASC) 5 MG tablet; Take 1 tablet by mouth Daily.  Dispense: 90 tablet; Refill: 3  -     losartan (COZAAR) 100 MG tablet; Take 1 tablet by mouth Daily.  Dispense: 90 tablet; Refill: 3  -     CBC & Differential  -     Comprehensive Metabolic Panel    2. Gastroesophageal reflux disease, unspecified whether esophagitis present    3. Encounter for prostate cancer screening  -     PSA Screen    4. Screening for cholesterol level  -     Lipid Panel    5. Cigarette smoker    6. Rotator cuff syndrome of right shoulder    Status of multiple issues reviewed today as above.  Hypertension is a chronic problem currently under good control.  Continue losartan and amlodipine at current doses.  Refill those today.  GERD is asymptomatic.  Takes over-the-counter PPIs.  We will do labs to screen for prostate cancer, high cholesterol, labs to screen for diabetes and anemia.  We will check renal  function due to ARB.  I will follow-up with him when the results of these test are back.  We discussed the stretching and strengthening exercises for right rotator cuff.  Let me know if this does not get his shoulder back in much more functional status.  Follow-up here again in 1 year or sooner if needed.        Call with any problems or concerns before next visit       Return in about 1 year (around 10/27/2024).      Much of this report is an electronic transcription of spoken language to printed text using Dragon dictation software.  As such, the subtleties and finesse of spoken language may permit erroneous, or at times, nonsensical words or phrases to be inadvertently transcribed; thus changes may be made at a later date to rectify these errors.     eRnetta Rich [id number] EDT  This note has been electronically signed

## 2023-10-28 LAB
ALBUMIN SERPL-MCNC: 4.5 G/DL (ref 3.8–4.9)
ALBUMIN/GLOB SERPL: 2 {RATIO} (ref 1.2–2.2)
ALP SERPL-CCNC: 63 IU/L (ref 44–121)
ALT SERPL-CCNC: 29 IU/L (ref 0–44)
AST SERPL-CCNC: 23 IU/L (ref 0–40)
BASOPHILS # BLD AUTO: 0.1 X10E3/UL (ref 0–0.2)
BASOPHILS NFR BLD AUTO: 1 %
BILIRUB SERPL-MCNC: 0.4 MG/DL (ref 0–1.2)
BUN SERPL-MCNC: 15 MG/DL (ref 6–24)
BUN/CREAT SERPL: 15 (ref 9–20)
CALCIUM SERPL-MCNC: 9.5 MG/DL (ref 8.7–10.2)
CHLORIDE SERPL-SCNC: 106 MMOL/L (ref 96–106)
CHOLEST SERPL-MCNC: 176 MG/DL (ref 100–199)
CO2 SERPL-SCNC: 21 MMOL/L (ref 20–29)
CREAT SERPL-MCNC: 0.98 MG/DL (ref 0.76–1.27)
EGFRCR SERPLBLD CKD-EPI 2021: 91 ML/MIN/1.73
EOSINOPHIL # BLD AUTO: 0.3 X10E3/UL (ref 0–0.4)
EOSINOPHIL NFR BLD AUTO: 4 %
ERYTHROCYTE [DISTWIDTH] IN BLOOD BY AUTOMATED COUNT: 12.6 % (ref 11.6–15.4)
GLOBULIN SER CALC-MCNC: 2.2 G/DL (ref 1.5–4.5)
GLUCOSE SERPL-MCNC: 90 MG/DL (ref 70–99)
HCT VFR BLD AUTO: 44.5 % (ref 37.5–51)
HDLC SERPL-MCNC: 45 MG/DL
HGB BLD-MCNC: 14.6 G/DL (ref 13–17.7)
IMM GRANULOCYTES # BLD AUTO: 0 X10E3/UL (ref 0–0.1)
IMM GRANULOCYTES NFR BLD AUTO: 0 %
LDLC SERPL CALC-MCNC: 79 MG/DL (ref 0–99)
LYMPHOCYTES # BLD AUTO: 2.3 X10E3/UL (ref 0.7–3.1)
LYMPHOCYTES NFR BLD AUTO: 28 %
MCH RBC QN AUTO: 30.9 PG (ref 26.6–33)
MCHC RBC AUTO-ENTMCNC: 32.8 G/DL (ref 31.5–35.7)
MCV RBC AUTO: 94 FL (ref 79–97)
MONOCYTES # BLD AUTO: 0.9 X10E3/UL (ref 0.1–0.9)
MONOCYTES NFR BLD AUTO: 11 %
NEUTROPHILS # BLD AUTO: 4.5 X10E3/UL (ref 1.4–7)
NEUTROPHILS NFR BLD AUTO: 56 %
PLATELET # BLD AUTO: 317 X10E3/UL (ref 150–450)
POTASSIUM SERPL-SCNC: 4.6 MMOL/L (ref 3.5–5.2)
PROT SERPL-MCNC: 6.7 G/DL (ref 6–8.5)
PSA SERPL-MCNC: 2 NG/ML (ref 0–4)
RBC # BLD AUTO: 4.72 X10E6/UL (ref 4.14–5.8)
SODIUM SERPL-SCNC: 142 MMOL/L (ref 134–144)
TRIGL SERPL-MCNC: 325 MG/DL (ref 0–149)
VLDLC SERPL CALC-MCNC: 52 MG/DL (ref 5–40)
WBC # BLD AUTO: 8.1 X10E3/UL (ref 3.4–10.8)

## 2024-10-21 DIAGNOSIS — I10 ESSENTIAL HYPERTENSION: ICD-10-CM

## 2024-10-21 RX ORDER — AMLODIPINE BESYLATE 5 MG/1
5 TABLET ORAL DAILY
Qty: 90 TABLET | Refills: 0 | Status: SHIPPED | OUTPATIENT
Start: 2024-10-21 | End: 2024-10-28 | Stop reason: SDUPTHER

## 2024-10-28 ENCOUNTER — OFFICE VISIT (OUTPATIENT)
Dept: FAMILY MEDICINE CLINIC | Facility: CLINIC | Age: 59
End: 2024-10-28
Payer: COMMERCIAL

## 2024-10-28 VITALS
BODY MASS INDEX: 25.34 KG/M2 | SYSTOLIC BLOOD PRESSURE: 148 MMHG | HEIGHT: 70 IN | HEART RATE: 82 BPM | WEIGHT: 177 LBS | OXYGEN SATURATION: 96 % | DIASTOLIC BLOOD PRESSURE: 93 MMHG | TEMPERATURE: 98.2 F

## 2024-10-28 DIAGNOSIS — K21.9 GASTROESOPHAGEAL REFLUX DISEASE, UNSPECIFIED WHETHER ESOPHAGITIS PRESENT: ICD-10-CM

## 2024-10-28 DIAGNOSIS — Z13.220 SCREENING FOR CHOLESTEROL LEVEL: ICD-10-CM

## 2024-10-28 DIAGNOSIS — I10 ESSENTIAL HYPERTENSION: Primary | ICD-10-CM

## 2024-10-28 DIAGNOSIS — Z12.5 ENCOUNTER FOR PROSTATE CANCER SCREENING: ICD-10-CM

## 2024-10-28 DIAGNOSIS — Z12.11 COLON CANCER SCREENING: ICD-10-CM

## 2024-10-28 DIAGNOSIS — Z11.59 NEED FOR HEPATITIS C SCREENING TEST: ICD-10-CM

## 2024-10-28 PROCEDURE — 99214 OFFICE O/P EST MOD 30 MIN: CPT | Performed by: FAMILY MEDICINE

## 2024-10-28 RX ORDER — LOSARTAN POTASSIUM 100 MG/1
100 TABLET ORAL DAILY
Qty: 90 TABLET | Refills: 3 | Status: SHIPPED | OUTPATIENT
Start: 2024-10-28

## 2024-10-28 RX ORDER — AMLODIPINE BESYLATE 5 MG/1
5 TABLET ORAL DAILY
Qty: 90 TABLET | Refills: 3 | Status: SHIPPED | OUTPATIENT
Start: 2024-10-28

## 2024-10-28 NOTE — PROGRESS NOTES
Answers submitted by the patient for this visit:  Primary Reason for Visit (Submitted on 10/21/2024)  What is the primary reason for your visit?: High Blood Pressure  High Blood Pressure Questionnaire (Submitted on 10/21/2024)  Chief Complaint: Hypertension  Chronicity: chronic  Onset: more than 1 year ago  Progression since onset: improved  anxiety: No  blurred vision: No  chest pain: No  headaches: No  malaise/fatigue: No  orthopnea: No  palpitations: No  peripheral edema: No  shortness of breath: No  Agents associated with hypertension: NSAIDs  Compliance problems: no compliance problems  Subjective   Juanjose Barton is a 59 y.o. male.   Chief Complaint   Patient presents with    Hypertension    Heartburn       History of Present Illness   59 y.o. male with history of GERD, hypertension, family history of prostate cancer in his father, nut allergy and daily smoking presents to the office today for annual follow-up.  I last saw him a year ago.  He had blood work at that time.  History of Present Illness  The patient presents for evaluation of multiple medical concerns.    He has been monitoring his blood pressure at home, which has consistently been in the high 120s to mid 130s for the past 1.5 years. He is currently taking two medications for blood pressure management. 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 He has experienced some weight loss, which he views positively. His shoulder condition has improved, likely due to regular exercise.    He is currently taking Prilosec once or twice a month as needed for heartburn. Additionally, he takes a multivitamin, fish oil, and vitamin C 1000 mg daily. He has an EpiPen for emergency use. He has a Cologuard kit at home that is over a year old. He recently switched his pharmacy to [facility] due to difficulties obtaining his prescriptions from  [facility].    In April 2024, he sustained a head injury that required 11 stitches to his scalp. He has noticed an unusual spot at the site of the injury and is concerned about a potential infection. He maintains good hygiene by washing his hair daily.    SOCIAL HISTORY  The patient is a smoker.      Patient Active Problem List    Diagnosis Date Noted    Gastroesophageal reflux disease 10/28/2024    Essential hypertension 09/20/2021    Nut allergy 09/20/2021    Family history of prostate cancer in father 09/20/2021    Cigarette smoker 09/20/2021     Note Last Updated: 9/20/2021     1 pack/day since age 27             Past Surgical History:   Procedure Laterality Date    HERNIA REPAIR  09/1997    INGUINAL HERNIA REPAIR Bilateral 1997     Current Outpatient Medications on File Prior to Visit   Medication Sig    EPINEPHrine (EPIPEN) 0.3 MG/0.3ML solution auto-injector injection     multivitamin with minerals tablet tablet Take 1 tablet by mouth Daily.    Omega-3 Fatty Acids (Omega 3 500) 500 MG capsule Take 2 capsules by mouth Daily.    omeprazole (priLOSEC) 20 MG capsule Take 1 capsule by mouth As Needed.    [DISCONTINUED] amLODIPine (NORVASC) 5 MG tablet Take 1 tablet by mouth once daily    [DISCONTINUED] losartan (COZAAR) 100 MG tablet Take 1 tablet by mouth Daily.     No current facility-administered medications on file prior to visit.     Allergies   Allergen Reactions    Nuts Swelling     Social History     Socioeconomic History    Marital status:     Number of children: 0   Tobacco Use    Smoking status: Every Day     Current packs/day: 1.00     Average packs/day: 1 pack/day for 32.8 years (32.8 ttl pk-yrs)     Types: Cigarettes     Start date: 1/1/1992     Passive exposure: Current    Smokeless tobacco: Never   Vaping Use    Vaping status: Never Used   Substance and Sexual Activity    Alcohol use: Not Currently    Drug use: Never    Sexual activity: Not Currently     Family History   Problem Relation Age of  "Onset    Arthritis Mother     Dementia Mother     Heart disease Father     Alcohol abuse Brother             Arthritis Paternal Aunt     No Known Problems Brother        Review of Systems    Objective   /93 (BP Location: Right arm, Patient Position: Sitting, Cuff Size: Adult)   Pulse 82   Temp 98.2 °F (36.8 °C) (Infrared)   Ht 177.8 cm (70\")   Wt 80.3 kg (177 lb)   SpO2 96%   BMI 25.40 kg/m²   Physical Exam  Constitutional:       Appearance: He is well-developed.      Comments:      HENT:      Head: Normocephalic and atraumatic.      Ears:      Comments: Margins of his wound on the scalp are irregular leading to a small crevice from which a clump of hairs are growing.  Eyes:      Conjunctiva/sclera: Conjunctivae normal.   Cardiovascular:      Rate and Rhythm: Normal rate.   Pulmonary:      Effort: Pulmonary effort is normal.   Musculoskeletal:         General: Normal range of motion.      Cervical back: Normal range of motion.   Skin:     General: Skin is warm and dry.      Findings: No rash.   Neurological:      Mental Status: He is alert and oriented to person, place, and time.   Psychiatric:         Behavior: Behavior normal.       Physical Exam  Vital Signs  Blood pressure reading is 148/93.      No visits with results within 4 Month(s) from this visit.   Latest known visit with results is:   Office Visit on 10/27/2023   Component Date Value Ref Range Status    WBC 10/27/2023 8.1  3.4 - 10.8 x10E3/uL Final    RBC 10/27/2023 4.72  4.14 - 5.80 x10E6/uL Final    Hemoglobin 10/27/2023 14.6  13.0 - 17.7 g/dL Final    Hematocrit 10/27/2023 44.5  37.5 - 51.0 % Final    MCV 10/27/2023 94  79 - 97 fL Final    MCH 10/27/2023 30.9  26.6 - 33.0 pg Final    MCHC 10/27/2023 32.8  31.5 - 35.7 g/dL Final    RDW 10/27/2023 12.6  11.6 - 15.4 % Final    Platelets 10/27/2023 317  150 - 450 x10E3/uL Final    Neutrophil Rel % 10/27/2023 56  Not Estab. % Final    Lymphocyte Rel % 10/27/2023 28  Not Estab. % Final "    Monocyte Rel % 10/27/2023 11  Not Estab. % Final    Eosinophil Rel % 10/27/2023 4  Not Estab. % Final    Basophil Rel % 10/27/2023 1  Not Estab. % Final    Neutrophils Absolute 10/27/2023 4.5  1.4 - 7.0 x10E3/uL Final    Lymphocytes Absolute 10/27/2023 2.3  0.7 - 3.1 x10E3/uL Final    Monocytes Absolute 10/27/2023 0.9  0.1 - 0.9 x10E3/uL Final    Eosinophils Absolute 10/27/2023 0.3  0.0 - 0.4 x10E3/uL Final    Basophils Absolute 10/27/2023 0.1  0.0 - 0.2 x10E3/uL Final    Immature Granulocyte Rel % 10/27/2023 0  Not Estab. % Final    Immature Grans Absolute 10/27/2023 0.0  0.0 - 0.1 x10E3/uL Final    Glucose 10/27/2023 90  70 - 99 mg/dL Final    BUN 10/27/2023 15  6 - 24 mg/dL Final    Creatinine 10/27/2023 0.98  0.76 - 1.27 mg/dL Final    EGFR Result 10/27/2023 91  >59 mL/min/1.73 Final    BUN/Creatinine Ratio 10/27/2023 15  9 - 20 Final    Sodium 10/27/2023 142  134 - 144 mmol/L Final    Potassium 10/27/2023 4.6  3.5 - 5.2 mmol/L Final    Chloride 10/27/2023 106  96 - 106 mmol/L Final    Total CO2 10/27/2023 21  20 - 29 mmol/L Final    Calcium 10/27/2023 9.5  8.7 - 10.2 mg/dL Final    Total Protein 10/27/2023 6.7  6.0 - 8.5 g/dL Final    Albumin 10/27/2023 4.5  3.8 - 4.9 g/dL Final    Globulin 10/27/2023 2.2  1.5 - 4.5 g/dL Final    A/G Ratio 10/27/2023 2.0  1.2 - 2.2 Final    Total Bilirubin 10/27/2023 0.4  0.0 - 1.2 mg/dL Final    Alkaline Phosphatase 10/27/2023 63  44 - 121 IU/L Final    AST (SGOT) 10/27/2023 23  0 - 40 IU/L Final    ALT (SGPT) 10/27/2023 29  0 - 44 IU/L Final    Total Cholesterol 10/27/2023 176  100 - 199 mg/dL Final    Triglycerides 10/27/2023 325 (H)  0 - 149 mg/dL Final    HDL Cholesterol 10/27/2023 45  >39 mg/dL Final    VLDL Cholesterol Luis 10/27/2023 52 (H)  5 - 40 mg/dL Final    LDL Chol Calc (NIH) 10/27/2023 79  0 - 99 mg/dL Final    PSA 10/27/2023 2.0  0.0 - 4.0 ng/mL Final    Comment: Roche ECLIA methodology.  According to the American Urological Association, Serum PSA  should  decrease and remain at undetectable levels after radical  prostatectomy. The AUA defines biochemical recurrence as an initial  PSA value 0.2 ng/mL or greater followed by a subsequent confirmatory  PSA value 0.2 ng/mL or greater.  Values obtained with different assay methods or kits cannot be used  interchangeably. Results cannot be interpreted as absolute evidence  of the presence or absence of malignant disease.       Results      BMI is >= 25 and <30. (Overweight) The following options were offered after discussion;: exercise counseling/recommendations and nutrition counseling/recommendations     Assessment & Plan   Diagnoses and all orders for this visit:    1. Essential hypertension (Primary)  -     CBC & Differential  -     Comprehensive Metabolic Panel  -     amLODIPine (NORVASC) 5 MG tablet; Take 1 tablet by mouth Daily.  Dispense: 90 tablet; Refill: 3  -     losartan (COZAAR) 100 MG tablet; Take 1 tablet by mouth Daily.  Dispense: 90 tablet; Refill: 3    2. Gastroesophageal reflux disease, unspecified whether esophagitis present    3. Encounter for prostate cancer screening  -     PSA Screen    4. Screening for cholesterol level  -     Lipid Panel    5. Need for hepatitis C screening test  -     Hepatitis C Antibody    6. Colon cancer screening  -     Cologuard - Stool, Per Rectum; Future      Assessment & Plan  1. Hypertension.  His home blood pressure readings are within the target range (high 120s to mid-130s). Current blood pressure medications were refilled at [facility] in [place]. The elevated reading in the office may be due to whitecoat syndrome or recent stress.  Continue to monitor blood pressure at home.    2. Gastroesophageal Reflux Disease (GERD).  He continues to take Prilosec on an as-needed basis, approximately once or twice a month.    3. Scalp Injury.  He has a plugged hair follicle at the site of a previous scalp laceration and stitches. He was informed that it is not harmful and can be  left untreated. If he wishes to have it removed for cosmetic reasons, a referral to a dermatologist or plastic surgeon would be necessary.    4. Smoking.  A discussion was held regarding the potential benefits of a CT scan for lung cancer screening due to his smoking history. He was advised to consider quitting smoking and informed that even if he quits now, the effects of smoking can last up to 20 years.    5. Health Maintenance.  A new Cologuard test was ordered as the previous kit is over a year old. Blood work was ordered to screen for prostate cancer.    I will follow-up with him when the results of the tests are back as above.      Call with any problems or concerns before next visit       Return in about 1 year (around 10/28/2025).  Patient or patient representative verbalized consent for the use of Ambient Listening during the visit with  Renetta Rich MD for chart documentation. 10/28/2024  13:36 EDT    Part of this note may be an electronic transcription/translation of spoken language to printed text using the Dragon Dictation System    Renetta Rich MD10/28/[id number] EDT  This note has been electronically signed

## 2024-10-29 LAB
ALBUMIN SERPL-MCNC: 4.9 G/DL (ref 3.8–4.9)
ALP SERPL-CCNC: 70 IU/L (ref 44–121)
ALT SERPL-CCNC: 29 IU/L (ref 0–44)
AST SERPL-CCNC: 23 IU/L (ref 0–40)
BASOPHILS # BLD AUTO: 0.1 X10E3/UL (ref 0–0.2)
BASOPHILS NFR BLD AUTO: 1 %
BILIRUB SERPL-MCNC: 0.2 MG/DL (ref 0–1.2)
BUN SERPL-MCNC: 13 MG/DL (ref 6–24)
BUN/CREAT SERPL: 13 (ref 9–20)
CALCIUM SERPL-MCNC: 9.6 MG/DL (ref 8.7–10.2)
CHLORIDE SERPL-SCNC: 104 MMOL/L (ref 96–106)
CHOLEST SERPL-MCNC: 186 MG/DL (ref 100–199)
CO2 SERPL-SCNC: 22 MMOL/L (ref 20–29)
CREAT SERPL-MCNC: 0.99 MG/DL (ref 0.76–1.27)
EGFRCR SERPLBLD CKD-EPI 2021: 89 ML/MIN/1.73
EOSINOPHIL # BLD AUTO: 0.2 X10E3/UL (ref 0–0.4)
EOSINOPHIL NFR BLD AUTO: 2 %
ERYTHROCYTE [DISTWIDTH] IN BLOOD BY AUTOMATED COUNT: 12.3 % (ref 11.6–15.4)
GLOBULIN SER CALC-MCNC: 2.4 G/DL (ref 1.5–4.5)
GLUCOSE SERPL-MCNC: 88 MG/DL (ref 70–99)
HCT VFR BLD AUTO: 47.5 % (ref 37.5–51)
HCV IGG SERPL QL IA: NON REACTIVE
HDLC SERPL-MCNC: 52 MG/DL
HGB BLD-MCNC: 15.9 G/DL (ref 13–17.7)
IMM GRANULOCYTES # BLD AUTO: 0.1 X10E3/UL (ref 0–0.1)
IMM GRANULOCYTES NFR BLD AUTO: 1 %
LDLC SERPL CALC-MCNC: 120 MG/DL (ref 0–99)
LYMPHOCYTES # BLD AUTO: 2.2 X10E3/UL (ref 0.7–3.1)
LYMPHOCYTES NFR BLD AUTO: 20 %
MCH RBC QN AUTO: 31.3 PG (ref 26.6–33)
MCHC RBC AUTO-ENTMCNC: 33.5 G/DL (ref 31.5–35.7)
MCV RBC AUTO: 94 FL (ref 79–97)
MONOCYTES # BLD AUTO: 1 X10E3/UL (ref 0.1–0.9)
MONOCYTES NFR BLD AUTO: 9 %
NEUTROPHILS # BLD AUTO: 7.8 X10E3/UL (ref 1.4–7)
NEUTROPHILS NFR BLD AUTO: 67 %
PLATELET # BLD AUTO: 326 X10E3/UL (ref 150–450)
POTASSIUM SERPL-SCNC: 4.6 MMOL/L (ref 3.5–5.2)
PROT SERPL-MCNC: 7.3 G/DL (ref 6–8.5)
PSA SERPL-MCNC: 2.1 NG/ML (ref 0–4)
RBC # BLD AUTO: 5.08 X10E6/UL (ref 4.14–5.8)
SODIUM SERPL-SCNC: 143 MMOL/L (ref 134–144)
TRIGL SERPL-MCNC: 76 MG/DL (ref 0–149)
VLDLC SERPL CALC-MCNC: 14 MG/DL (ref 5–40)
WBC # BLD AUTO: 11.4 X10E3/UL (ref 3.4–10.8)

## 2025-01-25 DIAGNOSIS — I10 ESSENTIAL HYPERTENSION: ICD-10-CM

## 2025-01-27 RX ORDER — AMLODIPINE BESYLATE 5 MG/1
5 TABLET ORAL DAILY
Qty: 90 TABLET | Refills: 0 | Status: SHIPPED | OUTPATIENT
Start: 2025-01-27